# Patient Record
Sex: MALE | Race: OTHER | URBAN - METROPOLITAN AREA
[De-identification: names, ages, dates, MRNs, and addresses within clinical notes are randomized per-mention and may not be internally consistent; named-entity substitution may affect disease eponyms.]

---

## 2020-06-01 ENCOUNTER — OFFICE VISIT (OUTPATIENT)
Dept: URBAN - METROPOLITAN AREA CLINIC 68 | Facility: CLINIC | Age: 65
End: 2020-06-01

## 2020-06-04 LAB
ALBUMIN/GLOBULIN RATIO: (no result)
ALBUMIN: (no result)
ALKALINE PHOSPHATASE: (no result)
ALT: (no result)
AST: (no result)
BILIRUBIN, TOTAL: (no result)
BUN/CREATININE RATIO: (no result)
CALCIUM: (no result)
CARBON DIOXIDE: (no result)
CHLORIDE: (no result)
CREATININE: (no result)
EGFR AFRICAN AMERICAN: (no result)
EGFR NON-AFR. AMERICAN: (no result)
GLOBULIN: (no result)
GLUCOSE: (no result)
POTASSIUM: (no result)
PROTEIN, TOTAL: (no result)
SODIUM: (no result)
UREA NITROGEN (BUN): (no result)

## 2020-06-05 ENCOUNTER — LAB OUTSIDE AN ENCOUNTER (OUTPATIENT)
Dept: URBAN - METROPOLITAN AREA CLINIC 68 | Facility: CLINIC | Age: 65
End: 2020-06-05

## 2020-06-08 ENCOUNTER — TELEPHONE ENCOUNTER (OUTPATIENT)
Dept: URBAN - METROPOLITAN AREA CLINIC 68 | Facility: CLINIC | Age: 65
End: 2020-06-08

## 2020-06-10 ENCOUNTER — OFFICE VISIT (OUTPATIENT)
Dept: URBAN - METROPOLITAN AREA CLINIC 68 | Facility: CLINIC | Age: 65
End: 2020-06-10

## 2020-06-22 ENCOUNTER — TELEPHONE ENCOUNTER (OUTPATIENT)
Dept: URBAN - METROPOLITAN AREA CLINIC 68 | Facility: CLINIC | Age: 65
End: 2020-06-22

## 2020-06-22 ENCOUNTER — OFFICE VISIT (OUTPATIENT)
Dept: URBAN - METROPOLITAN AREA MEDICAL CENTER 21 | Facility: MEDICAL CENTER | Age: 65
End: 2020-06-22

## 2020-06-25 ENCOUNTER — TELEPHONE ENCOUNTER (OUTPATIENT)
Dept: URBAN - METROPOLITAN AREA CLINIC 68 | Facility: CLINIC | Age: 65
End: 2020-06-25

## 2020-07-08 ENCOUNTER — OFFICE VISIT (OUTPATIENT)
Dept: URBAN - METROPOLITAN AREA CLINIC 68 | Facility: CLINIC | Age: 65
End: 2020-07-08

## 2020-07-13 ENCOUNTER — OFFICE VISIT (OUTPATIENT)
Dept: URBAN - METROPOLITAN AREA CLINIC 68 | Facility: CLINIC | Age: 65
End: 2020-07-13

## 2020-08-07 ENCOUNTER — OFFICE VISIT (OUTPATIENT)
Dept: URBAN - METROPOLITAN AREA CLINIC 68 | Facility: CLINIC | Age: 65
End: 2020-08-07

## 2020-08-12 ENCOUNTER — TELEPHONE ENCOUNTER (OUTPATIENT)
Dept: URBAN - METROPOLITAN AREA CLINIC 68 | Facility: CLINIC | Age: 65
End: 2020-08-12

## 2020-08-28 ENCOUNTER — OFFICE VISIT (OUTPATIENT)
Dept: URBAN - METROPOLITAN AREA CLINIC 68 | Facility: CLINIC | Age: 65
End: 2020-08-28

## 2020-09-08 ENCOUNTER — TELEPHONE ENCOUNTER (OUTPATIENT)
Dept: URBAN - METROPOLITAN AREA CLINIC 68 | Facility: CLINIC | Age: 65
End: 2020-09-08

## 2020-09-15 ENCOUNTER — TELEPHONE ENCOUNTER (OUTPATIENT)
Dept: URBAN - METROPOLITAN AREA CLINIC 68 | Facility: CLINIC | Age: 65
End: 2020-09-15

## 2020-09-15 ENCOUNTER — OFFICE VISIT (OUTPATIENT)
Dept: URBAN - METROPOLITAN AREA CLINIC 68 | Facility: CLINIC | Age: 65
End: 2020-09-15

## 2020-09-23 ENCOUNTER — TELEPHONE ENCOUNTER (OUTPATIENT)
Dept: URBAN - METROPOLITAN AREA CLINIC 68 | Facility: CLINIC | Age: 65
End: 2020-09-23

## 2020-09-30 ENCOUNTER — OFFICE VISIT (OUTPATIENT)
Dept: URBAN - METROPOLITAN AREA CLINIC 68 | Facility: CLINIC | Age: 65
End: 2020-09-30

## 2020-10-15 ENCOUNTER — TELEPHONE ENCOUNTER (OUTPATIENT)
Dept: URBAN - METROPOLITAN AREA CLINIC 68 | Facility: CLINIC | Age: 65
End: 2020-10-15

## 2020-10-16 ENCOUNTER — OFFICE VISIT (OUTPATIENT)
Dept: URBAN - METROPOLITAN AREA SURGERY CENTER 12 | Facility: SURGERY CENTER | Age: 65
End: 2020-10-16

## 2020-10-26 ENCOUNTER — OFFICE VISIT (OUTPATIENT)
Dept: URBAN - METROPOLITAN AREA MEDICAL CENTER 21 | Facility: MEDICAL CENTER | Age: 65
End: 2020-10-26

## 2021-03-09 ENCOUNTER — OFFICE VISIT (OUTPATIENT)
Age: 66
End: 2021-03-09

## 2021-04-15 ENCOUNTER — OFFICE VISIT (OUTPATIENT)
Age: 66
End: 2021-04-15

## 2021-06-16 ENCOUNTER — OFFICE VISIT (OUTPATIENT)
Dept: URBAN - METROPOLITAN AREA CLINIC 68 | Facility: CLINIC | Age: 66
End: 2021-06-16

## 2022-01-05 ENCOUNTER — OFFICE VISIT (OUTPATIENT)
Age: 67
End: 2022-01-05

## 2022-05-03 ENCOUNTER — OFFICE VISIT (OUTPATIENT)
Dept: URBAN - METROPOLITAN AREA CLINIC 68 | Facility: CLINIC | Age: 67
End: 2022-05-03

## 2022-05-06 ENCOUNTER — OFFICE VISIT (OUTPATIENT)
Dept: URBAN - METROPOLITAN AREA CLINIC 68 | Facility: CLINIC | Age: 67
End: 2022-05-06

## 2022-05-09 ENCOUNTER — TELEPHONE ENCOUNTER (OUTPATIENT)
Dept: URBAN - METROPOLITAN AREA CLINIC 68 | Facility: CLINIC | Age: 67
End: 2022-05-09

## 2022-05-23 ENCOUNTER — OFFICE VISIT (OUTPATIENT)
Dept: URBAN - METROPOLITAN AREA MEDICAL CENTER 21 | Facility: MEDICAL CENTER | Age: 67
End: 2022-05-23

## 2022-06-04 ENCOUNTER — TELEPHONE ENCOUNTER (OUTPATIENT)
Dept: URBAN - METROPOLITAN AREA CLINIC 68 | Facility: CLINIC | Age: 67
End: 2022-06-04

## 2022-06-04 RX ORDER — AMOXICILLIN 500 MG/1
TABLET, FILM COATED ORAL EVERY 12 HOURS
Qty: 56 | Refills: 0 | OUTPATIENT
Start: 2019-04-11 | End: 2019-04-25

## 2022-06-04 RX ORDER — SODIUM SULFATE, POTASSIUM SULFATE, MAGNESIUM SULFATE 17.5; 3.13; 1.6 G/ML; G/ML; G/ML
SOLUTION, CONCENTRATE ORAL AS DIRECTED
Qty: 1 | Refills: 0 | OUTPATIENT
Start: 2019-03-13 | End: 2019-03-14

## 2022-06-04 RX ORDER — CLARITHROMYCIN 500 MG/1
TABLET, FILM COATED ORAL EVERY 12 HOURS
Qty: 28 | Refills: 0 | OUTPATIENT
Start: 2019-04-11 | End: 2019-04-25

## 2022-06-04 RX ORDER — AMOXICILLIN 500 MG/1
AMOXICILLIN( 500MG ORAL  TWICE A DAY ) INACTIVE -HX ENTRY CAPSULE ORAL TWICE A DAY
OUTPATIENT
Start: 2020-04-29

## 2022-06-04 RX ORDER — DICYCLOMINE HYDROCHLORIDE 10 MG/1
CAPSULE ORAL EVERY 12 HOURS
Qty: 60 | Refills: 0 | OUTPATIENT
Start: 2020-07-13 | End: 2020-08-12

## 2022-06-05 ENCOUNTER — TELEPHONE ENCOUNTER (OUTPATIENT)
Dept: URBAN - METROPOLITAN AREA CLINIC 68 | Facility: CLINIC | Age: 67
End: 2022-06-05

## 2022-06-05 RX ORDER — PANTOPRAZOLE SODIUM 40 MG/1
TABLET, DELAYED RELEASE ORAL DAILY
Qty: 90 | Refills: 90 | Status: ACTIVE | COMMUNITY
Start: 2020-07-13

## 2022-06-05 RX ORDER — TAMSULOSIN HYDROCHLORIDE 0.4 MG/1
TAMSULOSIN HCL( 0.4MG ORAL  DAILY ) ACTIVE -HX ENTRY CAPSULE ORAL DAILY
Status: ACTIVE | COMMUNITY
Start: 2020-09-30

## 2022-06-05 RX ORDER — LEVOTHYROXINE SODIUM 0.2 MG/1
LEVOTHYROXINE SODIUM( 200MCG ORAL  DAILY ) ACTIVE -HX ENTRY TABLET ORAL DAILY
Status: ACTIVE | COMMUNITY
Start: 2020-09-30

## 2022-06-05 RX ORDER — ASPIRIN 325 MG/1
ASPIRIN( 325MG ORAL  DAILY ) ACTIVE -HX ENTRY TABLET ORAL DAILY
Status: ACTIVE | COMMUNITY
Start: 2020-09-30

## 2022-06-05 RX ORDER — SERTRALINE 100 MG/1
SERTRALINE HCL( 100MG ORAL  DAILY ) ACTIVE -HX ENTRY TABLET, FILM COATED ORAL DAILY
Status: ACTIVE | COMMUNITY
Start: 2020-09-30

## 2022-06-05 RX ORDER — ATORVASTATIN CALCIUM 80 MG/1
ATORVASTATIN CALCIUM( 80MG ORAL  DAILY ) ACTIVE -HX ENTRY TABLET, FILM COATED ORAL DAILY
Status: ACTIVE | COMMUNITY
Start: 2020-09-30

## 2022-06-05 RX ORDER — WHEAT DEXTRIN 3 G/4 G
POWDER (GRAM) ORAL
Qty: 0 | Refills: 0 | Status: ACTIVE | COMMUNITY
Start: 2020-07-13

## 2022-06-05 RX ORDER — CLOPIDOGREL 75 MG/1
CLOPIDOGREL BISULFATE( 75MG ORAL  DAILY ) ACTIVE -HX ENTRY TABLET ORAL DAILY
Status: ACTIVE | COMMUNITY
Start: 2020-09-30

## 2022-06-05 RX ORDER — FUROSEMIDE 40 MG/1
FUROSEMIDE( 40MG ORAL  DAILY ) ACTIVE -HX ENTRY TABLET ORAL DAILY
Status: ACTIVE | COMMUNITY
Start: 2020-09-30

## 2022-06-05 RX ORDER — FLUOXETINE 20 MG/1
FLUOXETINE HCL( 20MG ORAL  DAILY ) ACTIVE -HX ENTRY CAPSULE ORAL DAILY
Status: ACTIVE | COMMUNITY
Start: 2020-09-30

## 2022-06-05 RX ORDER — CIDER VINEGAR 300 MG
FISH OIL( 1000MG ORAL  TWICE A DAY ) ACTIVE -HX ENTRY TABLET ORAL TWICE A DAY
Status: ACTIVE | COMMUNITY
Start: 2020-09-30

## 2022-06-05 RX ORDER — NITROGLYCERIN 0.4 MG/1
NITROGLYCERIN( 0.4MG SUBLINGUAL  AS NEEDED ) ACTIVE -HX ENTRY TABLET SUBLINGUAL AS NEEDED
Status: ACTIVE | COMMUNITY
Start: 2020-09-30

## 2022-06-05 RX ORDER — LISINOPRIL 10 MG/1
LISINOPRIL( 10MG ORAL  DAILY ) ACTIVE -HX ENTRY TABLET ORAL DAILY
Status: ACTIVE | COMMUNITY
Start: 2020-09-30

## 2022-06-06 ENCOUNTER — OFFICE VISIT (OUTPATIENT)
Dept: URBAN - METROPOLITAN AREA MEDICAL CENTER 21 | Facility: MEDICAL CENTER | Age: 67
End: 2022-06-06

## 2022-06-08 ENCOUNTER — TELEPHONE ENCOUNTER (OUTPATIENT)
Dept: URBAN - METROPOLITAN AREA CLINIC 68 | Facility: CLINIC | Age: 67
End: 2022-06-08

## 2022-06-17 ENCOUNTER — OFFICE VISIT (OUTPATIENT)
Dept: URBAN - METROPOLITAN AREA CLINIC 68 | Facility: CLINIC | Age: 67
End: 2022-06-17

## 2022-06-23 ENCOUNTER — LAB OUTSIDE AN ENCOUNTER (OUTPATIENT)
Dept: URBAN - METROPOLITAN AREA CLINIC 68 | Facility: CLINIC | Age: 67
End: 2022-06-23

## 2022-06-23 LAB
ABSOLUTE BASOPHILS: (no result)
ABSOLUTE EOSINOPHILS: (no result)
ABSOLUTE LYMPHOCYTES: (no result)
ABSOLUTE MONOCYTES: (no result)
ABSOLUTE NEUTROPHILS: (no result)
ALBUMIN/GLOBULIN RATIO: (no result)
ALBUMIN: (no result)
ALKALINE PHOSPHATASE: (no result)
ALPHA 2 MACROGLOBULIN: (no result)
ALT: (no result)
ALT: (no result)
APOLIPOPROTEIN A1: (no result)
AST: (no result)
BASOPHILS: (no result)
BILIRUBIN, TOTAL: (no result)
BUN/CREATININE RATIO: (no result)
CALCIUM: (no result)
CARBON DIOXIDE: (no result)
CHLORIDE: (no result)
CREATININE: (no result)
EGFR AFRICAN AMERICAN: (no result)
EGFR NON-AFR. AMERICAN: (no result)
EOSINOPHILS: (no result)
FIBROSIS INTERPRETATION: (no result)
FIBROSIS SCORE: 0.52
FIBROSIS STAGE: (no result)
FOOTNOTE: (no result)
GGT: (no result)
GLOBULIN: (no result)
GLUCOSE: (no result)
HAPTOGLOBIN: (no result)
HEMATOCRIT: (no result)
HEMOGLOBIN: (no result)
INR: 1
LYMPHOCYTES: (no result)
MCH: (no result)
MCHC: (no result)
MCV: (no result)
MONOCYTES: (no result)
MPV: (no result)
NECROINFLAMMAT ACT GRADE: (no result)
NECROINFLAMMAT ACT SCORE: 0.35
NECROINFLAMMAT INTERP: (no result)
NEUTROPHILS: (no result)
PARTIAL THROMBOPLASTIN TIME, ACTIVATED: (no result)
PLATELET COUNT: (no result)
POTASSIUM: (no result)
PROTEIN, TOTAL: (no result)
PT: (no result)
RDW: (no result)
RED BLOOD CELL COUNT: (no result)
REFERENCE ID: (no result)
SODIUM: (no result)
TOTAL BILIRUBIN: (no result)
UREA NITROGEN (BUN): (no result)
WHITE BLOOD CELL COUNT: (no result)

## 2022-06-25 ENCOUNTER — TELEPHONE ENCOUNTER (OUTPATIENT)
Age: 67
End: 2022-06-25

## 2022-06-25 RX ORDER — POLYETHYLENE GLYCOL 3350, SODIUM SULFATE ANHYDROUS, SODIUM BICARBONATE, SODIUM CHLORIDE, POTASSIUM CHLORIDE 236; 22.74; 6.74; 5.86; 2.97 G/4L; G/4L; G/4L; G/4L; G/4L
POWDER, FOR SOLUTION ORAL DAILY
Qty: 1 | Refills: 0 | OUTPATIENT
Start: 2022-05-03 | End: 2022-05-04

## 2022-06-25 RX ORDER — AMOXICILLIN 500 MG/1
TABLET, FILM COATED ORAL EVERY 12 HOURS
Qty: 56 | Refills: 0 | OUTPATIENT
Start: 2019-04-11 | End: 2019-04-25

## 2022-06-25 RX ORDER — METHYLPREDNISOLONE 4 MG/1
METHYLPREDNISOLONE (PAK)( 4MG ORAL  AS DIRECTED ) INACTIVE -HX ENTRY TABLET ORAL AS DIRECTED
OUTPATIENT
Start: 2020-04-29

## 2022-06-25 RX ORDER — AMOXICILLIN 500 MG/1
AMOXICILLIN( 500MG ORAL  TWICE A DAY ) INACTIVE -HX ENTRY CAPSULE ORAL TWICE A DAY
OUTPATIENT
Start: 2020-04-29

## 2022-06-25 RX ORDER — DICYCLOMINE HYDROCHLORIDE 10 MG/1
CAPSULE ORAL EVERY 12 HOURS
Qty: 60 | Refills: 0 | OUTPATIENT
Start: 2020-07-13 | End: 2020-08-12

## 2022-06-25 RX ORDER — CLOPIDOGREL BISULFATE 75 MG/1
CLOPIDOGREL BISULFATE( 75MG ORAL  DAILY ) INACTIVE -HX ENTRY TABLET, FILM COATED ORAL DAILY
OUTPATIENT
Start: 2022-05-03

## 2022-06-25 RX ORDER — SODIUM SULFATE, POTASSIUM SULFATE, MAGNESIUM SULFATE 17.5; 3.13; 1.6 G/ML; G/ML; G/ML
SOLUTION, CONCENTRATE ORAL AS DIRECTED
Qty: 1 | Refills: 0 | OUTPATIENT
Start: 2019-03-13 | End: 2019-03-14

## 2022-06-25 RX ORDER — CLARITHROMYCIN 500 MG/1
TABLET ORAL EVERY 12 HOURS
Qty: 28 | Refills: 0 | OUTPATIENT
Start: 2019-04-11 | End: 2019-04-25

## 2022-06-26 ENCOUNTER — TELEPHONE ENCOUNTER (OUTPATIENT)
Age: 67
End: 2022-06-26

## 2022-06-26 RX ORDER — WHEAT DEXTRIN 3 G/4 G
POWDER (GRAM) ORAL
Qty: 0 | Refills: 0 | Status: ACTIVE | COMMUNITY
Start: 2020-07-13

## 2022-06-26 RX ORDER — SERTRALINE 100 MG/1
SERTRALINE HCL( 100MG ORAL  DAILY ) ACTIVE -HX ENTRY TABLET, FILM COATED ORAL DAILY
Status: ACTIVE | COMMUNITY
Start: 2022-06-17

## 2022-06-26 RX ORDER — FAMOTIDINE 20 MG/1
TABLET ORAL DAILY
Qty: 90 | Refills: 0 | Status: ACTIVE | COMMUNITY
Start: 2022-05-03

## 2022-06-26 RX ORDER — ASPIRIN 325 MG/1
ASPIRIN( 325MG ORAL  DAILY ) ACTIVE -HX ENTRY TABLET ORAL DAILY
Status: ACTIVE | COMMUNITY
Start: 2022-06-17

## 2022-06-26 RX ORDER — LISINOPRIL 10 MG/1
LISINOPRIL( 10MG ORAL  DAILY ) ACTIVE -HX ENTRY TABLET ORAL DAILY
Status: ACTIVE | COMMUNITY
Start: 2022-06-17

## 2022-06-26 RX ORDER — ATORVASTATIN CALCIUM 80 MG/1
ATORVASTATIN CALCIUM( 80MG ORAL  DAILY ) ACTIVE -HX ENTRY TABLET, FILM COATED ORAL DAILY
Status: ACTIVE | COMMUNITY
Start: 2022-06-17

## 2022-06-26 RX ORDER — NITROGLYCERIN 0.4 MG/1
NITROGLYCERIN( 0.4MG SUBLINGUAL  AS NEEDED ) ACTIVE -HX ENTRY TABLET SUBLINGUAL AS NEEDED
Status: ACTIVE | COMMUNITY
Start: 2022-06-17

## 2022-06-26 RX ORDER — LEVOTHYROXINE SODIUM 200 UG/1
LEVOTHYROXINE SODIUM( 200MCG ORAL  DAILY ) ACTIVE -HX ENTRY TABLET ORAL DAILY
Status: ACTIVE | COMMUNITY
Start: 2022-06-17

## 2022-06-26 RX ORDER — CIDER VINEGAR 300 MG
FISH OIL( 1000MG ORAL  TWICE A DAY ) ACTIVE -HX ENTRY TABLET ORAL TWICE A DAY
Status: ACTIVE | COMMUNITY
Start: 2022-06-17

## 2022-06-26 RX ORDER — TAMSULOSIN HYDROCHLORIDE 0.4 MG/1
TAMSULOSIN HCL( 0.4MG ORAL  DAILY ) ACTIVE -HX ENTRY CAPSULE ORAL DAILY
Status: ACTIVE | COMMUNITY
Start: 2022-06-17

## 2022-06-26 RX ORDER — FLUOXETINE HYDROCHLORIDE 20 MG/1
FLUOXETINE HCL( 20MG ORAL  DAILY ) ACTIVE -HX ENTRY CAPSULE ORAL DAILY
Status: ACTIVE | COMMUNITY
Start: 2022-06-17

## 2022-06-26 RX ORDER — FUROSEMIDE 40 MG/1
FUROSEMIDE( 40MG ORAL  DAILY ) ACTIVE -HX ENTRY TABLET ORAL DAILY
Status: ACTIVE | COMMUNITY
Start: 2022-06-17

## 2022-06-26 RX ORDER — METOPROLOL SUCCINATE 50 MG/1
METOPROLOL SUCCINATE( 50MG ORAL 1 1/2 TABS DAILY ) ACTIVE -HX ENTRY TABLET, EXTENDED RELEASE ORAL DAILY
Status: ACTIVE | COMMUNITY
Start: 2022-06-17

## 2022-06-26 RX ORDER — PANTOPRAZOLE 40 MG/1
TABLET, DELAYED RELEASE ORAL EVERY 12 HOURS
Qty: 180 | Refills: 180 | Status: ACTIVE | COMMUNITY
Start: 2022-06-17

## 2022-06-27 ENCOUNTER — OFFICE VISIT (OUTPATIENT)
Age: 67
End: 2022-06-27

## 2022-06-27 ENCOUNTER — TELEPHONE ENCOUNTER (OUTPATIENT)
Dept: URBAN - METROPOLITAN AREA CLINIC 68 | Facility: CLINIC | Age: 67
End: 2022-06-27

## 2022-06-28 ENCOUNTER — LAB OUTSIDE AN ENCOUNTER (OUTPATIENT)
Age: 67
End: 2022-06-28

## 2022-06-30 LAB
ABSOLUTE BASOPHILS: 29
ABSOLUTE EOSINOPHILS: 133
ABSOLUTE LYMPHOCYTES: 795
ABSOLUTE MONOCYTES: 394
ABSOLUTE NEUTROPHILS: 4449
ALBUMIN/GLOBULIN RATIO: 1.5
ALBUMIN: 4.9
ALKALINE PHOSPHATASE: 122
ALPHA 2 MACROGLOBULIN: 216
ALT: 48
ALT: 48
APOLIPOPROTEIN A1: 135
AST: 46
BASOPHILS: 0.5
BILIRUBIN, TOTAL: 0.5
BUN/CREATININE RATIO: (no result)
CALCIUM: 10.3
CARBON DIOXIDE: 25
CHLORIDE: 105
CREATININE: 1.2
EGFR AFRICAN AMERICAN: 73
EGFR NON-AFR. AMERICAN: 63
EOSINOPHILS: 2.3
FIBROSIS INTERPRETATION: (no result)
FIBROSIS SCORE: 0.52
FIBROSIS STAGE: (no result)
FOOTNOTE: (no result)
GGT: 202
GLOBULIN: 3.2
GLUCOSE: 118
HAPTOGLOBIN: 190
HEMATOCRIT: 42.5
HEMOGLOBIN: 13.4
INR: 1
LYMPHOCYTES: 13.7
MCH: 27.5
MCHC: 31.5
MCV: 87.1
MONOCYTES: 6.8
MPV: 12.5
NECROINFLAMMAT ACT GRADE: (no result)
NECROINFLAMMAT ACT SCORE: 0.35
NECROINFLAMMAT INTERP: (no result)
NEUTROPHILS: 76.7
PARTIAL THROMBOPLASTIN: 24
PLATELET COUNT: 167
POTASSIUM: 4.5
PROTEIN, TOTAL: 8.1
PT: 10.4
RDW: 12.7
RED BLOOD CELL COUNT: 4.88
REFERENCE ID: (no result)
SODIUM: 145
TOTAL BILIRUBIN: 0.5
UREA NITROGEN (BUN): 18
WHITE BLOOD CELL COUNT: 5.8

## 2022-07-23 ENCOUNTER — TELEPHONE ENCOUNTER (OUTPATIENT)
Dept: URBAN - METROPOLITAN AREA CLINIC 68 | Facility: CLINIC | Age: 67
End: 2022-07-23

## 2022-08-02 ENCOUNTER — OFFICE VISIT (OUTPATIENT)
Dept: URBAN - METROPOLITAN AREA CLINIC 68 | Facility: CLINIC | Age: 67
End: 2022-08-02

## 2022-08-02 ENCOUNTER — LAB OUTSIDE AN ENCOUNTER (OUTPATIENT)
Dept: URBAN - METROPOLITAN AREA CLINIC 68 | Facility: CLINIC | Age: 67
End: 2022-08-02

## 2022-08-02 RX ORDER — CIDER VINEGAR 300 MG
FISH OIL( 1000MG ORAL  TWICE A DAY ) ACTIVE -HX ENTRY TABLET ORAL TWICE A DAY
Status: ACTIVE | COMMUNITY
Start: 2020-09-30

## 2022-08-02 RX ORDER — NITROGLYCERIN 0.4 MG/1
NITROGLYCERIN( 0.4MG SUBLINGUAL  AS NEEDED ) ACTIVE -HX ENTRY TABLET SUBLINGUAL AS NEEDED
Status: ACTIVE | COMMUNITY
Start: 2020-09-30

## 2022-08-02 RX ORDER — PANTOPRAZOLE SODIUM 40 MG/1
TABLET, DELAYED RELEASE ORAL DAILY
Qty: 90 | Refills: 90 | Status: ACTIVE | COMMUNITY
Start: 2020-07-13

## 2022-08-02 RX ORDER — TAMSULOSIN HYDROCHLORIDE 0.4 MG/1
TAMSULOSIN HCL( 0.4MG ORAL  DAILY ) ACTIVE -HX ENTRY CAPSULE ORAL DAILY
Status: ACTIVE | COMMUNITY
Start: 2020-09-30

## 2022-08-02 RX ORDER — LISINOPRIL 10 MG/1
LISINOPRIL( 10MG ORAL  DAILY ) ACTIVE -HX ENTRY TABLET ORAL DAILY
Status: ACTIVE | COMMUNITY
Start: 2020-09-30

## 2022-08-02 RX ORDER — ASPIRIN 325 MG/1
ASPIRIN( 325MG ORAL  DAILY ) ACTIVE -HX ENTRY TABLET ORAL DAILY
Status: ACTIVE | COMMUNITY
Start: 2020-09-30

## 2022-08-02 RX ORDER — ATORVASTATIN CALCIUM 80 MG/1
ATORVASTATIN CALCIUM( 80MG ORAL  DAILY ) ACTIVE -HX ENTRY TABLET, FILM COATED ORAL DAILY
Status: ACTIVE | COMMUNITY
Start: 2020-09-30

## 2022-08-02 RX ORDER — FUROSEMIDE 40 MG/1
FUROSEMIDE( 40MG ORAL  DAILY ) ACTIVE -HX ENTRY TABLET ORAL DAILY
Status: ACTIVE | COMMUNITY
Start: 2020-09-30

## 2022-08-02 RX ORDER — LEVOTHYROXINE SODIUM 0.2 MG/1
LEVOTHYROXINE SODIUM( 200MCG ORAL  DAILY ) ACTIVE -HX ENTRY TABLET ORAL DAILY
Status: ACTIVE | COMMUNITY
Start: 2020-09-30

## 2022-08-02 RX ORDER — CLOPIDOGREL 75 MG/1
CLOPIDOGREL BISULFATE( 75MG ORAL  DAILY ) ACTIVE -HX ENTRY TABLET ORAL DAILY
Status: ACTIVE | COMMUNITY
Start: 2020-09-30

## 2022-08-02 RX ORDER — FLUOXETINE 20 MG/1
FLUOXETINE HCL( 20MG ORAL  DAILY ) ACTIVE -HX ENTRY CAPSULE ORAL DAILY
Status: ACTIVE | COMMUNITY
Start: 2020-09-30

## 2022-08-02 RX ORDER — SERTRALINE 100 MG/1
SERTRALINE HCL( 100MG ORAL  DAILY ) ACTIVE -HX ENTRY TABLET, FILM COATED ORAL DAILY
Status: ACTIVE | COMMUNITY
Start: 2020-09-30

## 2022-08-02 RX ORDER — WHEAT DEXTRIN 3 G/4 G
POWDER (GRAM) ORAL
Qty: 0 | Refills: 0 | Status: ACTIVE | COMMUNITY
Start: 2020-07-13

## 2022-08-02 NOTE — HPI-MIGRATED HPI
General : Pateint comes in for follow up of heartburn.  On the last appointment he was place on Pantoprazole bid and H2 blockers PRN. For evaluation he underwent EGD resutls are detailed below.  He refers that his symptoms are controlled  on PPi bid. He has not had to use H2 blockers. He has not other complaints

## 2022-08-30 ENCOUNTER — TELEPHONE ENCOUNTER (OUTPATIENT)
Dept: URBAN - METROPOLITAN AREA CLINIC 68 | Facility: CLINIC | Age: 67
End: 2022-08-30

## 2022-08-31 ENCOUNTER — OFFICE VISIT (OUTPATIENT)
Dept: URBAN - METROPOLITAN AREA CLINIC 68 | Facility: CLINIC | Age: 67
End: 2022-08-31

## 2022-08-31 ENCOUNTER — LAB OUTSIDE AN ENCOUNTER (OUTPATIENT)
Dept: URBAN - METROPOLITAN AREA CLINIC 68 | Facility: CLINIC | Age: 67
End: 2022-08-31

## 2022-08-31 RX ORDER — ATORVASTATIN CALCIUM 80 MG/1
ATORVASTATIN CALCIUM( 80MG ORAL  DAILY ) ACTIVE -HX ENTRY TABLET, FILM COATED ORAL DAILY
Status: ACTIVE | COMMUNITY
Start: 2020-09-30

## 2022-08-31 RX ORDER — FUROSEMIDE 40 MG/1
FUROSEMIDE( 40MG ORAL  DAILY ) ACTIVE -HX ENTRY TABLET ORAL DAILY
Status: ACTIVE | COMMUNITY
Start: 2020-09-30

## 2022-08-31 RX ORDER — LISINOPRIL 10 MG/1
LISINOPRIL( 10MG ORAL  DAILY ) ACTIVE -HX ENTRY TABLET ORAL DAILY
Status: ACTIVE | COMMUNITY
Start: 2020-09-30

## 2022-08-31 RX ORDER — LEVOTHYROXINE SODIUM 0.2 MG/1
LEVOTHYROXINE SODIUM( 200MCG ORAL  DAILY ) ACTIVE -HX ENTRY TABLET ORAL DAILY
Status: ACTIVE | COMMUNITY
Start: 2020-09-30

## 2022-08-31 RX ORDER — NITROGLYCERIN 0.4 MG/1
NITROGLYCERIN( 0.4MG SUBLINGUAL  AS NEEDED ) ACTIVE -HX ENTRY TABLET SUBLINGUAL AS NEEDED
Status: ACTIVE | COMMUNITY
Start: 2020-09-30

## 2022-08-31 RX ORDER — FLUOXETINE 20 MG/1
FLUOXETINE HCL( 20MG ORAL  DAILY ) ACTIVE -HX ENTRY CAPSULE ORAL DAILY
Status: ACTIVE | COMMUNITY
Start: 2020-09-30

## 2022-08-31 RX ORDER — TAMSULOSIN HYDROCHLORIDE 0.4 MG/1
TAMSULOSIN HCL( 0.4MG ORAL  DAILY ) ACTIVE -HX ENTRY CAPSULE ORAL DAILY
Status: ACTIVE | COMMUNITY
Start: 2020-09-30

## 2022-08-31 RX ORDER — WHEAT DEXTRIN 3 G/4 G
POWDER (GRAM) ORAL
Qty: 0 | Refills: 0 | Status: ACTIVE | COMMUNITY
Start: 2020-07-13

## 2022-08-31 RX ORDER — PANTOPRAZOLE SODIUM 40 MG/1
TABLET, DELAYED RELEASE ORAL DAILY
Qty: 90 | Refills: 90 | Status: ACTIVE | COMMUNITY
Start: 2020-07-13

## 2022-08-31 RX ORDER — ASPIRIN 325 MG/1
ASPIRIN( 325MG ORAL  DAILY ) ACTIVE -HX ENTRY TABLET ORAL DAILY
Status: ACTIVE | COMMUNITY
Start: 2020-09-30

## 2022-08-31 RX ORDER — CIDER VINEGAR 300 MG
FISH OIL( 1000MG ORAL  TWICE A DAY ) ACTIVE -HX ENTRY TABLET ORAL TWICE A DAY
Status: ACTIVE | COMMUNITY
Start: 2020-09-30

## 2022-08-31 RX ORDER — SERTRALINE 100 MG/1
SERTRALINE HCL( 100MG ORAL  DAILY ) ACTIVE -HX ENTRY TABLET, FILM COATED ORAL DAILY
Status: ACTIVE | COMMUNITY
Start: 2020-09-30

## 2022-08-31 RX ORDER — CLOPIDOGREL 75 MG/1
CLOPIDOGREL BISULFATE( 75MG ORAL  DAILY ) ACTIVE -HX ENTRY TABLET ORAL DAILY
Status: ACTIVE | COMMUNITY
Start: 2020-09-30

## 2022-08-31 NOTE — HPI-MIGRATED HPI
General : Patient comes in for follow up of liver steatosis. He is being followed up due to fatty liver. He underwent liver elastography remarkable for advance fibrosis. 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 He comes in today for follow up. He denies melena hematochezia hematemesis coffee ground emesis.

## 2023-05-23 ENCOUNTER — OFFICE VISIT (OUTPATIENT)
Age: 68
End: 2023-05-23

## 2023-06-06 ENCOUNTER — CLAIMS CREATED FROM THE CLAIM WINDOW (OUTPATIENT)
Dept: URBAN - METROPOLITAN AREA MEDICAL CENTER 20 | Facility: MEDICAL CENTER | Age: 68
End: 2023-06-06
Payer: OTHER GOVERNMENT

## 2023-06-06 DIAGNOSIS — N30.01 ACUTE CYSTITIS WITH HEMATURIA: ICD-10-CM

## 2023-06-06 DIAGNOSIS — I50.21 ACUTE SYSTOLIC CONGESTIVE HEART FAILURE: ICD-10-CM

## 2023-06-06 DIAGNOSIS — I48.91 AFIB: ICD-10-CM

## 2023-06-06 DIAGNOSIS — D64.9 ANEMIA: ICD-10-CM

## 2023-06-06 PROCEDURE — 99223 1ST HOSP IP/OBS HIGH 75: CPT | Performed by: SPECIALIST

## 2023-07-10 ENCOUNTER — LAB OUTSIDE AN ENCOUNTER (OUTPATIENT)
Dept: URBAN - METROPOLITAN AREA CLINIC 68 | Facility: CLINIC | Age: 68
End: 2023-07-10

## 2023-07-10 ENCOUNTER — OFFICE VISIT (OUTPATIENT)
Dept: URBAN - METROPOLITAN AREA CLINIC 68 | Facility: CLINIC | Age: 68
End: 2023-07-10
Payer: OTHER GOVERNMENT

## 2023-07-10 VITALS
WEIGHT: 251 LBS | SYSTOLIC BLOOD PRESSURE: 130 MMHG | DIASTOLIC BLOOD PRESSURE: 80 MMHG | BODY MASS INDEX: 33.27 KG/M2 | HEIGHT: 73 IN

## 2023-07-10 DIAGNOSIS — K75.81 NONALCOHOLIC STEATOHEPATITIS (NASH): ICD-10-CM

## 2023-07-10 DIAGNOSIS — Z95.810 AICD (AUTOMATIC CARDIOVERTER/DEFIBRILLATOR) PRESENT: ICD-10-CM

## 2023-07-10 DIAGNOSIS — R19.5 OCCULT BLOOD IN STOOLS: ICD-10-CM

## 2023-07-10 DIAGNOSIS — K76.0 FATTY LIVER: ICD-10-CM

## 2023-07-10 DIAGNOSIS — K74.60 UNSPECIFIED CIRRHOSIS OF LIVER: ICD-10-CM

## 2023-07-10 DIAGNOSIS — D50.9 IRON DEFICIENCY ANEMIA, UNSPECIFIED IRON DEFICIENCY ANEMIA TYPE: ICD-10-CM

## 2023-07-10 PROBLEM — 443325000: Status: ACTIVE | Noted: 2023-07-10

## 2023-07-10 PROBLEM — 19943007: Status: ACTIVE | Noted: 2023-07-09

## 2023-07-10 PROBLEM — 266468003: Status: ACTIVE | Noted: 2023-07-09

## 2023-07-10 PROBLEM — 59614000: Status: ACTIVE | Noted: 2023-07-10

## 2023-07-10 PROCEDURE — 99215 OFFICE O/P EST HI 40 MIN: CPT | Performed by: INTERNAL MEDICINE

## 2023-07-10 RX ORDER — CIDER VINEGAR 300 MG
FISH OIL( 1000MG ORAL  TWICE A DAY ) ACTIVE -HX ENTRY TABLET ORAL TWICE A DAY
Status: ACTIVE | COMMUNITY
Start: 2020-09-30

## 2023-07-10 RX ORDER — ASPIRIN 325 MG/1
ASPIRIN( 325MG ORAL  DAILY ) ACTIVE -HX ENTRY TABLET ORAL DAILY
Status: ACTIVE | COMMUNITY
Start: 2020-09-30

## 2023-07-10 RX ORDER — TAMSULOSIN HYDROCHLORIDE 0.4 MG/1
TAMSULOSIN HCL( 0.4MG ORAL  DAILY ) ACTIVE -HX ENTRY CAPSULE ORAL DAILY
Status: ACTIVE | COMMUNITY
Start: 2020-09-30

## 2023-07-10 RX ORDER — PANTOPRAZOLE SODIUM 40 MG/1
TABLET, DELAYED RELEASE ORAL DAILY
Qty: 90 | Refills: 90 | Status: ACTIVE | COMMUNITY
Start: 2020-07-13

## 2023-07-10 RX ORDER — WHEAT DEXTRIN 3 G/4 G
POWDER (GRAM) ORAL
Qty: 0 | Refills: 0 | Status: ACTIVE | COMMUNITY
Start: 2020-07-13

## 2023-07-10 RX ORDER — FUROSEMIDE 40 MG/1
FUROSEMIDE( 40MG ORAL  DAILY ) ACTIVE -HX ENTRY TABLET ORAL DAILY
Status: ACTIVE | COMMUNITY
Start: 2020-09-30

## 2023-07-10 RX ORDER — CLOPIDOGREL 75 MG/1
CLOPIDOGREL BISULFATE( 75MG ORAL  DAILY ) ACTIVE -HX ENTRY TABLET ORAL DAILY
Status: ACTIVE | COMMUNITY
Start: 2020-09-30

## 2023-07-10 RX ORDER — FLUOXETINE 20 MG/1
FLUOXETINE HCL( 20MG ORAL  DAILY ) ACTIVE -HX ENTRY CAPSULE ORAL DAILY
Status: ACTIVE | COMMUNITY
Start: 2020-09-30

## 2023-07-10 RX ORDER — LEVOTHYROXINE SODIUM 0.2 MG/1
LEVOTHYROXINE SODIUM( 200MCG ORAL  DAILY ) ACTIVE -HX ENTRY TABLET ORAL DAILY
Status: ACTIVE | COMMUNITY
Start: 2020-09-30

## 2023-07-10 RX ORDER — ATORVASTATIN CALCIUM 80 MG/1
ATORVASTATIN CALCIUM( 80MG ORAL  DAILY ) ACTIVE -HX ENTRY TABLET, FILM COATED ORAL DAILY
Status: ACTIVE | COMMUNITY
Start: 2020-09-30

## 2023-07-10 RX ORDER — SERTRALINE 100 MG/1
SERTRALINE HCL( 100MG ORAL  DAILY ) ACTIVE -HX ENTRY TABLET, FILM COATED ORAL DAILY
Status: ACTIVE | COMMUNITY
Start: 2020-09-30

## 2023-07-10 RX ORDER — NITROGLYCERIN 0.4 MG/1
NITROGLYCERIN( 0.4MG SUBLINGUAL  AS NEEDED ) ACTIVE -HX ENTRY TABLET SUBLINGUAL AS NEEDED
Status: ACTIVE | COMMUNITY
Start: 2020-09-30

## 2023-07-10 RX ORDER — LISINOPRIL 10 MG/1
LISINOPRIL( 10MG ORAL  DAILY ) ACTIVE -HX ENTRY TABLET ORAL DAILY
Status: ACTIVE | COMMUNITY
Start: 2020-09-30

## 2023-07-10 NOTE — HPI-TODAY'S VISIT:
Case of a 67-year-old male patient that comes in today for follow-up.  Patient was recently seen in the inpatient setting due to worsening iron deficiency anemia without overt GI bleeding.  He did test positive for this for occult blood in the stools.  Nevertheless he denies melena hematochezia hematemesis coffee-ground emesis.  He received blood transfusions while in the inpatient setting.  No invasive GI procedures were repeated while hospitalized.  He comes in today for follow-up.  He continues on iron infusions by hematology.  He continues to deny any clinical signs for overt GI bleeding.  He comes in today for follow-up.

## 2023-07-25 ENCOUNTER — CLAIMS CREATED FROM THE CLAIM WINDOW (OUTPATIENT)
Dept: URBAN - METROPOLITAN AREA CLINIC 67 | Facility: CLINIC | Age: 68
End: 2023-07-25
Payer: OTHER GOVERNMENT

## 2023-07-25 DIAGNOSIS — K74.60 CIRRHOSIS OF LIVER WITHOUT ASCITES, UNSPECIFIED HEPATIC CIRRHOSIS TYPE: ICD-10-CM

## 2023-07-25 DIAGNOSIS — K76.0 FATTY (CHANGE OF) LIVER, NOT ELSEWHERE CLASSIFIED: ICD-10-CM

## 2023-07-25 PROCEDURE — 76705 ECHO EXAM OF ABDOMEN: CPT | Performed by: INTERNAL MEDICINE

## 2023-07-25 PROCEDURE — 93976 VASCULAR STUDY: CPT | Performed by: INTERNAL MEDICINE

## 2023-07-31 ENCOUNTER — OFFICE VISIT (OUTPATIENT)
Dept: URBAN - METROPOLITAN AREA MEDICAL CENTER 21 | Facility: MEDICAL CENTER | Age: 68
End: 2023-07-31
Payer: OTHER GOVERNMENT

## 2023-07-31 ENCOUNTER — TELEPHONE ENCOUNTER (OUTPATIENT)
Dept: URBAN - METROPOLITAN AREA CLINIC 68 | Facility: CLINIC | Age: 68
End: 2023-07-31

## 2023-07-31 DIAGNOSIS — K29.60 OTHER GASTRITIS WITHOUT BLEEDING: ICD-10-CM

## 2023-07-31 DIAGNOSIS — D50.9 IRON DEFICIENCY ANEMIA, UNSPECIFIED IRON DEFICIENCY ANEMIA TYPE: ICD-10-CM

## 2023-07-31 DIAGNOSIS — K31.89 OTHER DISEASES OF STOMACH AND DUODENUM: ICD-10-CM

## 2023-07-31 PROCEDURE — 44361 SMALL BOWEL ENDOSCOPY/BIOPSY: CPT | Performed by: INTERNAL MEDICINE

## 2023-08-01 ENCOUNTER — OFFICE VISIT (OUTPATIENT)
Dept: URBAN - METROPOLITAN AREA CLINIC 67 | Facility: CLINIC | Age: 68
End: 2023-08-01

## 2023-08-14 PROBLEM — 8493009: Status: ACTIVE | Noted: 2023-08-14

## 2023-08-15 ENCOUNTER — TELEPHONE ENCOUNTER (OUTPATIENT)
Dept: URBAN - METROPOLITAN AREA CLINIC 68 | Facility: CLINIC | Age: 68
End: 2023-08-15

## 2023-08-15 ENCOUNTER — OFFICE VISIT (OUTPATIENT)
Dept: URBAN - METROPOLITAN AREA CLINIC 68 | Facility: CLINIC | Age: 68
End: 2023-08-15
Payer: OTHER GOVERNMENT

## 2023-08-15 ENCOUNTER — LAB OUTSIDE AN ENCOUNTER (OUTPATIENT)
Dept: URBAN - METROPOLITAN AREA CLINIC 68 | Facility: CLINIC | Age: 68
End: 2023-08-15

## 2023-08-15 VITALS
DIASTOLIC BLOOD PRESSURE: 80 MMHG | BODY MASS INDEX: 33.27 KG/M2 | SYSTOLIC BLOOD PRESSURE: 146 MMHG | WEIGHT: 251 LBS | HEIGHT: 73 IN

## 2023-08-15 DIAGNOSIS — K76.0 FATTY (CHANGE OF) LIVER, NOT ELSEWHERE CLASSIFIED: ICD-10-CM

## 2023-08-15 DIAGNOSIS — D50.9 IRON DEFICIENCY ANEMIA, UNSPECIFIED IRON DEFICIENCY ANEMIA TYPE: ICD-10-CM

## 2023-08-15 PROCEDURE — 99214 OFFICE O/P EST MOD 30 MIN: CPT | Performed by: INTERNAL MEDICINE

## 2023-08-15 RX ORDER — FUROSEMIDE 40 MG/1
FUROSEMIDE( 40MG ORAL  DAILY ) ACTIVE -HX ENTRY TABLET ORAL DAILY
Status: ACTIVE | COMMUNITY
Start: 2020-09-30

## 2023-08-15 RX ORDER — PANTOPRAZOLE SODIUM 40 MG/1
TABLET, DELAYED RELEASE ORAL DAILY
Qty: 90 | Refills: 90 | Status: ACTIVE | COMMUNITY
Start: 2020-07-13

## 2023-08-15 RX ORDER — SERTRALINE 100 MG/1
SERTRALINE HCL( 100MG ORAL  DAILY ) ACTIVE -HX ENTRY TABLET, FILM COATED ORAL DAILY
Status: ACTIVE | COMMUNITY
Start: 2020-09-30

## 2023-08-15 RX ORDER — LISINOPRIL 10 MG/1
LISINOPRIL( 10MG ORAL  DAILY ) ACTIVE -HX ENTRY TABLET ORAL DAILY
Status: ACTIVE | COMMUNITY
Start: 2020-09-30

## 2023-08-15 RX ORDER — FLUOXETINE 20 MG/1
FLUOXETINE HCL( 20MG ORAL  DAILY ) ACTIVE -HX ENTRY CAPSULE ORAL DAILY
Status: ACTIVE | COMMUNITY
Start: 2020-09-30

## 2023-08-15 RX ORDER — TAMSULOSIN HYDROCHLORIDE 0.4 MG/1
TAMSULOSIN HCL( 0.4MG ORAL  DAILY ) ACTIVE -HX ENTRY CAPSULE ORAL DAILY
Status: ACTIVE | COMMUNITY
Start: 2020-09-30

## 2023-08-15 RX ORDER — WHEAT DEXTRIN 3 G/4 G
POWDER (GRAM) ORAL
Qty: 0 | Refills: 0 | Status: ACTIVE | COMMUNITY
Start: 2020-07-13

## 2023-08-15 RX ORDER — CLOPIDOGREL 75 MG/1
CLOPIDOGREL BISULFATE( 75MG ORAL  DAILY ) ACTIVE -HX ENTRY TABLET ORAL DAILY
Status: ACTIVE | COMMUNITY
Start: 2020-09-30

## 2023-08-15 RX ORDER — LEVOTHYROXINE SODIUM 0.2 MG/1
LEVOTHYROXINE SODIUM( 200MCG ORAL  DAILY ) ACTIVE -HX ENTRY TABLET ORAL DAILY
Status: ACTIVE | COMMUNITY
Start: 2020-09-30

## 2023-08-15 RX ORDER — ASPIRIN 325 MG/1
ASPIRIN( 325MG ORAL  DAILY ) ACTIVE -HX ENTRY TABLET ORAL DAILY
Status: ACTIVE | COMMUNITY
Start: 2020-09-30

## 2023-08-15 RX ORDER — CIDER VINEGAR 300 MG
FISH OIL( 1000MG ORAL  TWICE A DAY ) ACTIVE -HX ENTRY TABLET ORAL TWICE A DAY
Status: ACTIVE | COMMUNITY
Start: 2020-09-30

## 2023-08-15 RX ORDER — ATORVASTATIN CALCIUM 80 MG/1
ATORVASTATIN CALCIUM( 80MG ORAL  DAILY ) ACTIVE -HX ENTRY TABLET, FILM COATED ORAL DAILY
Status: ACTIVE | COMMUNITY
Start: 2020-09-30

## 2023-08-15 RX ORDER — NITROGLYCERIN 0.4 MG/1
NITROGLYCERIN( 0.4MG SUBLINGUAL  AS NEEDED ) ACTIVE -HX ENTRY TABLET SUBLINGUAL AS NEEDED
Status: ACTIVE | COMMUNITY
Start: 2020-09-30

## 2023-08-15 NOTE — HPI-TODAY'S VISIT:
Case of a 67-year-old male patient that comes in today for follow-up.  Patient was recently seen in the inpatient setting due to worsening iron deficiency anemia without overt GI bleeding.  He did test positive for this for occult blood in the stools.  Nevertheless he denies melena hematochezia hematemesis coffee-ground emesis.  He received blood transfusions while in the inpatient setting.  Recently for evaluation he has undergone EGD colonoscopy and enteroscopy   He comes in today for follow-up. He continues with the need for iron infusion. Continues wo overt GI bleeding

## 2023-08-21 ENCOUNTER — WEB ENCOUNTER (OUTPATIENT)
Dept: URBAN - METROPOLITAN AREA CLINIC 67 | Facility: CLINIC | Age: 68
End: 2023-08-21

## 2023-08-22 ENCOUNTER — OFFICE VISIT (OUTPATIENT)
Dept: URBAN - METROPOLITAN AREA CLINIC 67 | Facility: CLINIC | Age: 68
End: 2023-08-22

## 2023-08-31 PROBLEM — 197321007: Status: ACTIVE | Noted: 2023-08-01

## 2023-09-05 ENCOUNTER — OFFICE VISIT (OUTPATIENT)
Dept: URBAN - METROPOLITAN AREA CLINIC 67 | Facility: CLINIC | Age: 68
End: 2023-09-05
Payer: OTHER GOVERNMENT

## 2023-09-05 DIAGNOSIS — D50.9 IRON DEFICIENCY ANEMIA, UNSPECIFIED IRON DEFICIENCY ANEMIA TYPE: ICD-10-CM

## 2023-09-05 PROCEDURE — 91110 GI TRC IMG INTRAL ESOPH-ILE: CPT | Performed by: INTERNAL MEDICINE

## 2023-09-06 ENCOUNTER — TELEPHONE ENCOUNTER (OUTPATIENT)
Dept: URBAN - METROPOLITAN AREA CLINIC 63 | Facility: CLINIC | Age: 68
End: 2023-09-06

## 2023-10-25 ENCOUNTER — TELEPHONE ENCOUNTER (OUTPATIENT)
Dept: URBAN - METROPOLITAN AREA CLINIC 68 | Facility: CLINIC | Age: 68
End: 2023-10-25

## 2024-02-13 PROBLEM — 19943007: Status: ACTIVE | Noted: 2023-08-01

## 2024-02-16 ENCOUNTER — RES OV (OUTPATIENT)
Dept: URBAN - METROPOLITAN AREA CLINIC 68 | Facility: CLINIC | Age: 69
End: 2024-02-16

## 2024-02-16 RX ORDER — CLOPIDOGREL 75 MG/1
CLOPIDOGREL BISULFATE( 75MG ORAL  DAILY ) ACTIVE -HX ENTRY TABLET ORAL DAILY
Status: ACTIVE | COMMUNITY
Start: 2020-09-30

## 2024-02-16 RX ORDER — SERTRALINE 100 MG/1
SERTRALINE HCL( 100MG ORAL  DAILY ) ACTIVE -HX ENTRY TABLET, FILM COATED ORAL DAILY
Status: ACTIVE | COMMUNITY
Start: 2020-09-30

## 2024-02-16 RX ORDER — LEVOTHYROXINE SODIUM 0.2 MG/1
LEVOTHYROXINE SODIUM( 200MCG ORAL  DAILY ) ACTIVE -HX ENTRY TABLET ORAL DAILY
Status: ACTIVE | COMMUNITY
Start: 2020-09-30

## 2024-02-16 RX ORDER — LISINOPRIL 10 MG/1
LISINOPRIL( 10MG ORAL  DAILY ) ACTIVE -HX ENTRY TABLET ORAL DAILY
Status: ACTIVE | COMMUNITY
Start: 2020-09-30

## 2024-02-16 RX ORDER — PANTOPRAZOLE SODIUM 40 MG/1
TABLET, DELAYED RELEASE ORAL DAILY
Qty: 90 | Refills: 90 | Status: ACTIVE | COMMUNITY
Start: 2020-07-13

## 2024-02-16 RX ORDER — ATORVASTATIN CALCIUM 80 MG/1
ATORVASTATIN CALCIUM( 80MG ORAL  DAILY ) ACTIVE -HX ENTRY TABLET, FILM COATED ORAL DAILY
Status: ACTIVE | COMMUNITY
Start: 2020-09-30

## 2024-02-16 RX ORDER — FUROSEMIDE 40 MG/1
FUROSEMIDE( 40MG ORAL  DAILY ) ACTIVE -HX ENTRY TABLET ORAL DAILY
Status: ACTIVE | COMMUNITY
Start: 2020-09-30

## 2024-02-16 RX ORDER — WHEAT DEXTRIN 3 G/4 G
POWDER (GRAM) ORAL
Qty: 0 | Refills: 0 | Status: ACTIVE | COMMUNITY
Start: 2020-07-13

## 2024-02-16 RX ORDER — FLUOXETINE 20 MG/1
FLUOXETINE HCL( 20MG ORAL  DAILY ) ACTIVE -HX ENTRY CAPSULE ORAL DAILY
Status: ACTIVE | COMMUNITY
Start: 2020-09-30

## 2024-02-16 RX ORDER — NITROGLYCERIN 0.4 MG/1
NITROGLYCERIN( 0.4MG SUBLINGUAL  AS NEEDED ) ACTIVE -HX ENTRY TABLET SUBLINGUAL AS NEEDED
Status: ACTIVE | COMMUNITY
Start: 2020-09-30

## 2024-02-16 RX ORDER — TAMSULOSIN HYDROCHLORIDE 0.4 MG/1
TAMSULOSIN HCL( 0.4MG ORAL  DAILY ) ACTIVE -HX ENTRY CAPSULE ORAL DAILY
Status: ACTIVE | COMMUNITY
Start: 2020-09-30

## 2024-02-16 RX ORDER — CIDER VINEGAR 300 MG
FISH OIL( 1000MG ORAL  TWICE A DAY ) ACTIVE -HX ENTRY TABLET ORAL TWICE A DAY
Status: ACTIVE | COMMUNITY
Start: 2020-09-30

## 2024-02-16 RX ORDER — ASPIRIN 325 MG/1
ASPIRIN( 325MG ORAL  DAILY ) ACTIVE -HX ENTRY TABLET ORAL DAILY
Status: ACTIVE | COMMUNITY
Start: 2020-09-30

## 2024-02-16 NOTE — HPI-TODAY'S VISIT:
Case of a 68 yom that comes in today for screening for candidacy to the protocol [id number].  Upon interrogation he refers is feeling well without acute complaints.  Denies worsening abdominal distention.  Denies abdominal pain nausea vomiting.  Denies melena hematochezia hematemesis coffee-ground emesis.  He comes in today for evaluation.

## 2024-03-19 ENCOUNTER — LAB (OUTPATIENT)
Dept: URBAN - METROPOLITAN AREA CLINIC 68 | Facility: CLINIC | Age: 69
End: 2024-03-19

## 2024-03-19 ENCOUNTER — OV EP (OUTPATIENT)
Dept: URBAN - METROPOLITAN AREA CLINIC 68 | Facility: CLINIC | Age: 69
End: 2024-03-19
Payer: OTHER GOVERNMENT

## 2024-03-19 VITALS
SYSTOLIC BLOOD PRESSURE: 136 MMHG | WEIGHT: 250 LBS | DIASTOLIC BLOOD PRESSURE: 80 MMHG | HEIGHT: 73 IN | BODY MASS INDEX: 33.13 KG/M2

## 2024-03-19 DIAGNOSIS — K74.60 CIRRHOSIS OF LIVER WITHOUT ASCITES, UNSPECIFIED HEPATIC CIRRHOSIS TYPE: ICD-10-CM

## 2024-03-19 PROCEDURE — 99214 OFFICE O/P EST MOD 30 MIN: CPT | Performed by: INTERNAL MEDICINE

## 2024-03-19 RX ORDER — FUROSEMIDE 40 MG/1
FUROSEMIDE( 40MG ORAL  DAILY ) ACTIVE -HX ENTRY TABLET ORAL DAILY
Status: ACTIVE | COMMUNITY
Start: 2020-09-30

## 2024-03-19 RX ORDER — LISINOPRIL 10 MG/1
LISINOPRIL( 10MG ORAL  DAILY ) ACTIVE -HX ENTRY TABLET ORAL DAILY
Status: ACTIVE | COMMUNITY
Start: 2020-09-30

## 2024-03-19 RX ORDER — PANTOPRAZOLE SODIUM 40 MG/1
TABLET, DELAYED RELEASE ORAL DAILY
Qty: 90 | Refills: 90 | Status: ACTIVE | COMMUNITY
Start: 2020-07-13

## 2024-03-19 RX ORDER — FLUOXETINE 20 MG/1
FLUOXETINE HCL( 20MG ORAL  DAILY ) ACTIVE -HX ENTRY CAPSULE ORAL DAILY
Status: ACTIVE | COMMUNITY
Start: 2020-09-30

## 2024-03-19 RX ORDER — SERTRALINE 100 MG/1
SERTRALINE HCL( 100MG ORAL  DAILY ) ACTIVE -HX ENTRY TABLET, FILM COATED ORAL DAILY
Status: ACTIVE | COMMUNITY
Start: 2020-09-30

## 2024-03-19 RX ORDER — LEVOTHYROXINE SODIUM 0.2 MG/1
LEVOTHYROXINE SODIUM( 200MCG ORAL  DAILY ) ACTIVE -HX ENTRY TABLET ORAL DAILY
Status: ACTIVE | COMMUNITY
Start: 2020-09-30

## 2024-03-19 RX ORDER — CLOPIDOGREL 75 MG/1
CLOPIDOGREL BISULFATE( 75MG ORAL  DAILY ) ACTIVE -HX ENTRY TABLET ORAL DAILY
Status: ACTIVE | COMMUNITY
Start: 2020-09-30

## 2024-03-19 RX ORDER — ASPIRIN 325 MG/1
ASPIRIN( 325MG ORAL  DAILY ) ACTIVE -HX ENTRY TABLET ORAL DAILY
Status: ACTIVE | COMMUNITY
Start: 2020-09-30

## 2024-03-19 RX ORDER — TAMSULOSIN HYDROCHLORIDE 0.4 MG/1
TAMSULOSIN HCL( 0.4MG ORAL  DAILY ) ACTIVE -HX ENTRY CAPSULE ORAL DAILY
Status: ACTIVE | COMMUNITY
Start: 2020-09-30

## 2024-03-19 RX ORDER — NITROGLYCERIN 0.4 MG/1
NITROGLYCERIN( 0.4MG SUBLINGUAL  AS NEEDED ) ACTIVE -HX ENTRY TABLET SUBLINGUAL AS NEEDED
Status: ACTIVE | COMMUNITY
Start: 2020-09-30

## 2024-03-19 RX ORDER — WHEAT DEXTRIN 3 G/4 G
POWDER (GRAM) ORAL
Qty: 0 | Refills: 0 | Status: ACTIVE | COMMUNITY
Start: 2020-07-13

## 2024-03-19 RX ORDER — ATORVASTATIN CALCIUM 80 MG/1
ATORVASTATIN CALCIUM( 80MG ORAL  DAILY ) ACTIVE -HX ENTRY TABLET, FILM COATED ORAL DAILY
Status: ACTIVE | COMMUNITY
Start: 2020-09-30

## 2024-03-19 RX ORDER — CIDER VINEGAR 300 MG
FISH OIL( 1000MG ORAL  TWICE A DAY ) ACTIVE -HX ENTRY TABLET ORAL TWICE A DAY
Status: ACTIVE | COMMUNITY
Start: 2020-09-30

## 2024-03-19 NOTE — HPI-TODAY'S VISIT:
Case of a  68 YOM that comes in today for follow up of cirrhosis. 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 Upon interrogation, he denies fatigue, easy bruisability, lower extremity edema, fever, weight loss or diarrhea. Denies icteric/jaundice or pruritus. Denies increasing abdominal girth. Denies confusion or sleep disturbance. Denies any signs of upper gastrointestinal bleeding (hematemesis, coffee ground emesis, melena, hematochezia).  Denies continued alcohol ingestion.  Denies illicit drug use. He has no acute complaints

## 2024-03-22 LAB
A/G RATIO: 1.7
ABSOLUTE BASOPHILS: 38
ABSOLUTE EOSINOPHILS: 118
ABSOLUTE LYMPHOCYTES: 689
ABSOLUTE MONOCYTES: 357
ABSOLUTE NEUTROPHILS: 2999
ALBUMIN: 4.7
ALKALINE PHOSPHATASE: 130
ALT (SGPT): 25
AST (SGOT): 36
BASOPHILS: 0.9
BILIRUBIN, TOTAL: 0.7
BUN/CREATININE RATIO: (no result)
BUN: 17
CALCIUM: 10.3
CARBON DIOXIDE, TOTAL: 26
CHLORIDE: 104
CREATININE: 1.1
EGFR: 74
EOSINOPHILS: 2.8
GLOBULIN, TOTAL: 2.8
GLUCOSE: 114
HEMATOCRIT: 42.2
HEMOGLOBIN: 13.8
INR: 1.2
LYMPHOCYTES: 16.4
MCH: 30.5
MCHC: 32.7
MCV: 93.4
MONOCYTES: 8.5
MPV: 12.4
NEUTROPHILS: 71.4
PLATELET COUNT: 116
POTASSIUM: 4.6
PROTEIN, TOTAL: 7.5
PT: 12.5
RDW: 15.5
RED BLOOD CELL COUNT: 4.52
SODIUM: 141
WHITE BLOOD CELL COUNT: 4.2

## 2024-04-02 ENCOUNTER — US ABD/PEL (OUTPATIENT)
Dept: URBAN - METROPOLITAN AREA CLINIC 67 | Facility: CLINIC | Age: 69
End: 2024-04-02

## 2024-04-16 ENCOUNTER — US ABD/PEL (OUTPATIENT)
Dept: URBAN - METROPOLITAN AREA CLINIC 67 | Facility: CLINIC | Age: 69
End: 2024-04-16
Payer: OTHER GOVERNMENT

## 2024-04-16 DIAGNOSIS — K80.20 CHOLELITHIASIS WITHOUT CHOLECYSTITIS: ICD-10-CM

## 2024-04-16 DIAGNOSIS — K74.60 CIRRHOSIS OF LIVER WITHOUT ASCITES, UNSPECIFIED HEPATIC CIRRHOSIS TYPE: ICD-10-CM

## 2024-04-16 PROCEDURE — 76705 ECHO EXAM OF ABDOMEN: CPT | Performed by: INTERNAL MEDICINE

## 2024-04-16 PROCEDURE — 93976 VASCULAR STUDY: CPT | Performed by: INTERNAL MEDICINE

## 2024-07-19 ENCOUNTER — DASHBOARD ENCOUNTERS (OUTPATIENT)
Age: 69
End: 2024-07-19

## 2024-07-19 ENCOUNTER — TELEPHONE ENCOUNTER (OUTPATIENT)
Dept: URBAN - METROPOLITAN AREA CLINIC 68 | Facility: CLINIC | Age: 69
End: 2024-07-19

## 2024-07-23 ENCOUNTER — LAB OUTSIDE AN ENCOUNTER (OUTPATIENT)
Dept: URBAN - METROPOLITAN AREA CLINIC 68 | Facility: CLINIC | Age: 69
End: 2024-07-23

## 2024-07-23 ENCOUNTER — OFFICE VISIT (OUTPATIENT)
Dept: URBAN - METROPOLITAN AREA CLINIC 68 | Facility: CLINIC | Age: 69
End: 2024-07-23
Payer: OTHER GOVERNMENT

## 2024-07-23 ENCOUNTER — TELEPHONE ENCOUNTER (OUTPATIENT)
Dept: URBAN - METROPOLITAN AREA CLINIC 68 | Facility: CLINIC | Age: 69
End: 2024-07-23

## 2024-07-23 VITALS
HEIGHT: 73 IN | BODY MASS INDEX: 33 KG/M2 | SYSTOLIC BLOOD PRESSURE: 116 MMHG | DIASTOLIC BLOOD PRESSURE: 60 MMHG | WEIGHT: 249 LBS

## 2024-07-23 DIAGNOSIS — K74.60 CIRRHOSIS OF LIVER WITHOUT ASCITES, UNSPECIFIED HEPATIC CIRRHOSIS TYPE: ICD-10-CM

## 2024-07-23 PROCEDURE — 99214 OFFICE O/P EST MOD 30 MIN: CPT | Performed by: INTERNAL MEDICINE

## 2024-07-23 RX ORDER — ASPIRIN 325 MG/1
ASPIRIN( 325MG ORAL  DAILY ) ACTIVE -HX ENTRY TABLET ORAL DAILY
Status: ACTIVE | COMMUNITY
Start: 2020-09-30

## 2024-07-23 RX ORDER — LISINOPRIL 10 MG/1
LISINOPRIL( 10MG ORAL  DAILY ) ACTIVE -HX ENTRY TABLET ORAL DAILY
Status: ACTIVE | COMMUNITY
Start: 2020-09-30

## 2024-07-23 RX ORDER — CIDER VINEGAR 300 MG
FISH OIL( 1000MG ORAL  TWICE A DAY ) ACTIVE -HX ENTRY TABLET ORAL TWICE A DAY
Status: ACTIVE | COMMUNITY
Start: 2020-09-30

## 2024-07-23 RX ORDER — TAMSULOSIN HYDROCHLORIDE 0.4 MG/1
TAMSULOSIN HCL( 0.4MG ORAL  DAILY ) ACTIVE -HX ENTRY CAPSULE ORAL DAILY
Status: ACTIVE | COMMUNITY
Start: 2020-09-30

## 2024-07-23 RX ORDER — NITROGLYCERIN 0.4 MG/1
NITROGLYCERIN( 0.4MG SUBLINGUAL  AS NEEDED ) ACTIVE -HX ENTRY TABLET SUBLINGUAL AS NEEDED
Status: ACTIVE | COMMUNITY
Start: 2020-09-30

## 2024-07-23 RX ORDER — ATORVASTATIN CALCIUM 80 MG/1
ATORVASTATIN CALCIUM( 80MG ORAL  DAILY ) ACTIVE -HX ENTRY TABLET, FILM COATED ORAL DAILY
Status: ACTIVE | COMMUNITY
Start: 2020-09-30

## 2024-07-23 RX ORDER — LEVOTHYROXINE SODIUM 0.2 MG/1
LEVOTHYROXINE SODIUM( 200MCG ORAL  DAILY ) ACTIVE -HX ENTRY TABLET ORAL DAILY
Status: ACTIVE | COMMUNITY
Start: 2020-09-30

## 2024-07-23 RX ORDER — SERTRALINE 100 MG/1
SERTRALINE HCL( 100MG ORAL  DAILY ) ACTIVE -HX ENTRY TABLET, FILM COATED ORAL DAILY
Status: ACTIVE | COMMUNITY
Start: 2020-09-30

## 2024-07-23 RX ORDER — CLOPIDOGREL 75 MG/1
CLOPIDOGREL BISULFATE( 75MG ORAL  DAILY ) ACTIVE -HX ENTRY TABLET ORAL DAILY
Status: ACTIVE | COMMUNITY
Start: 2020-09-30

## 2024-07-23 RX ORDER — WHEAT DEXTRIN 3 G/4 G
POWDER (GRAM) ORAL
Qty: 0 | Refills: 0 | Status: ACTIVE | COMMUNITY
Start: 2020-07-13

## 2024-07-23 RX ORDER — PANTOPRAZOLE SODIUM 40 MG/1
TABLET, DELAYED RELEASE ORAL DAILY
Qty: 90 | Refills: 90 | Status: ACTIVE | COMMUNITY
Start: 2020-07-13

## 2024-07-23 RX ORDER — FUROSEMIDE 40 MG/1
FUROSEMIDE( 40MG ORAL  DAILY ) ACTIVE -HX ENTRY TABLET ORAL DAILY
Status: ACTIVE | COMMUNITY
Start: 2020-09-30

## 2024-07-23 RX ORDER — FLUOXETINE 20 MG/1
FLUOXETINE HCL( 20MG ORAL  DAILY ) ACTIVE -HX ENTRY CAPSULE ORAL DAILY
Status: ACTIVE | COMMUNITY
Start: 2020-09-30

## 2024-07-28 LAB
A/G RATIO: 1.4
ABSOLUTE BASOPHILS: 30
ABSOLUTE EOSINOPHILS: 111
ABSOLUTE LYMPHOCYTES: 677
ABSOLUTE MONOCYTES: 303
ABSOLUTE NEUTROPHILS: 2579
ALBUMIN: 4
ALKALINE PHOSPHATASE: 155
ALT (SGPT): 19
AST (SGOT): 29
BASOPHILS: 0.8
BILIRUBIN, TOTAL: 0.5
BUN/CREATININE RATIO: (no result)
BUN: 18
CALCIUM: 9.1
CARBON DIOXIDE, TOTAL: 28
CHLORIDE: 102
CREATININE: 0.95
EGFR: 88
EOSINOPHILS: 3
GLOBULIN, TOTAL: 2.8
GLUCOSE: 101
HEMATOCRIT: 38.5
HEMOGLOBIN: 12.5
INR: 1.2
LYMPHOCYTES: 18.3
MCH: 30
MCHC: 32.5
MCV: 92.3
MONOCYTES: 8.2
MPV: 12.6
NEUTROPHILS: 69.7
PLATELET COUNT: 123
POTASSIUM: 4.2
PROTEIN, TOTAL: 6.8
PT: 12.5
RDW: 14
RED BLOOD CELL COUNT: 4.17
SODIUM: 142
WHITE BLOOD CELL COUNT: 3.7

## 2024-07-29 ENCOUNTER — TELEPHONE ENCOUNTER (OUTPATIENT)
Dept: URBAN - METROPOLITAN AREA CLINIC 68 | Facility: CLINIC | Age: 69
End: 2024-07-29

## 2024-08-13 ENCOUNTER — TELEPHONE ENCOUNTER (OUTPATIENT)
Dept: URBAN - METROPOLITAN AREA CLINIC 23 | Facility: CLINIC | Age: 69
End: 2024-08-13

## 2024-08-19 ENCOUNTER — OFFICE VISIT (OUTPATIENT)
Dept: URBAN - METROPOLITAN AREA CLINIC 68 | Facility: CLINIC | Age: 69
End: 2024-08-19
Payer: OTHER GOVERNMENT

## 2024-08-19 ENCOUNTER — OFFICE VISIT (OUTPATIENT)
Dept: URBAN - METROPOLITAN AREA CLINIC 68 | Facility: CLINIC | Age: 69
End: 2024-08-19

## 2024-08-19 VITALS
BODY MASS INDEX: 33 KG/M2 | DIASTOLIC BLOOD PRESSURE: 70 MMHG | HEART RATE: 64 BPM | HEIGHT: 73 IN | SYSTOLIC BLOOD PRESSURE: 128 MMHG | WEIGHT: 249 LBS | RESPIRATION RATE: 16 BRPM

## 2024-08-19 DIAGNOSIS — K74.60 CIRRHOSIS OF LIVER WITHOUT ASCITES, UNSPECIFIED HEPATIC CIRRHOSIS TYPE: ICD-10-CM

## 2024-08-19 PROCEDURE — 993 APS NON BILLABLE: Performed by: INTERNAL MEDICINE

## 2024-08-19 RX ORDER — LEVOTHYROXINE SODIUM 0.2 MG/1
LEVOTHYROXINE SODIUM( 200MCG ORAL  DAILY ) ACTIVE -HX ENTRY TABLET ORAL DAILY
Status: ACTIVE | COMMUNITY
Start: 2020-09-30

## 2024-08-19 RX ORDER — NITROGLYCERIN 0.4 MG/1
NITROGLYCERIN( 0.4MG SUBLINGUAL  AS NEEDED ) ACTIVE -HX ENTRY TABLET SUBLINGUAL AS NEEDED
Status: ACTIVE | COMMUNITY
Start: 2020-09-30

## 2024-08-19 RX ORDER — SERTRALINE 100 MG/1
SERTRALINE HCL( 100MG ORAL  DAILY ) ACTIVE -HX ENTRY TABLET, FILM COATED ORAL DAILY
Status: ACTIVE | COMMUNITY
Start: 2020-09-30

## 2024-08-19 RX ORDER — FUROSEMIDE 40 MG/1
FUROSEMIDE( 40MG ORAL  DAILY ) ACTIVE -HX ENTRY TABLET ORAL DAILY
Status: ACTIVE | COMMUNITY
Start: 2020-09-30

## 2024-08-19 RX ORDER — FLUOXETINE 20 MG/1
FLUOXETINE HCL( 20MG ORAL  DAILY ) ACTIVE -HX ENTRY CAPSULE ORAL DAILY
Status: ACTIVE | COMMUNITY
Start: 2020-09-30

## 2024-08-19 RX ORDER — PANTOPRAZOLE SODIUM 40 MG/1
TABLET, DELAYED RELEASE ORAL DAILY
Qty: 90 | Refills: 90 | Status: ACTIVE | COMMUNITY
Start: 2020-07-13

## 2024-08-19 RX ORDER — TAMSULOSIN HYDROCHLORIDE 0.4 MG/1
TAMSULOSIN HCL( 0.4MG ORAL  DAILY ) ACTIVE -HX ENTRY CAPSULE ORAL DAILY
Status: ACTIVE | COMMUNITY
Start: 2020-09-30

## 2024-08-19 RX ORDER — CIDER VINEGAR 300 MG
FISH OIL( 1000MG ORAL  TWICE A DAY ) ACTIVE -HX ENTRY TABLET ORAL TWICE A DAY
Status: ACTIVE | COMMUNITY
Start: 2020-09-30

## 2024-08-19 RX ORDER — CLOPIDOGREL 75 MG/1
CLOPIDOGREL BISULFATE( 75MG ORAL  DAILY ) ACTIVE -HX ENTRY TABLET ORAL DAILY
Status: ACTIVE | COMMUNITY
Start: 2020-09-30

## 2024-08-19 RX ORDER — WHEAT DEXTRIN 3 G/4 G
POWDER (GRAM) ORAL
Qty: 0 | Refills: 0 | Status: ACTIVE | COMMUNITY
Start: 2020-07-13

## 2024-08-19 RX ORDER — ASPIRIN 325 MG/1
ASPIRIN( 325MG ORAL  DAILY ) ACTIVE -HX ENTRY TABLET ORAL DAILY
Status: ACTIVE | COMMUNITY
Start: 2020-09-30

## 2024-08-19 RX ORDER — ATORVASTATIN CALCIUM 80 MG/1
ATORVASTATIN CALCIUM( 80MG ORAL  DAILY ) ACTIVE -HX ENTRY TABLET, FILM COATED ORAL DAILY
Status: ACTIVE | COMMUNITY
Start: 2020-09-30

## 2024-08-19 RX ORDER — LISINOPRIL 10 MG/1
LISINOPRIL( 10MG ORAL  DAILY ) ACTIVE -HX ENTRY TABLET ORAL DAILY
Status: ACTIVE | COMMUNITY
Start: 2020-09-30

## 2024-08-19 NOTE — HPI-TODAY'S VISIT:
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 Upon interrogation, he denies fatigue, easy bruisability, lower extremity edema, fever, weight loss or diarrhea. Denies icteric/jaundice or pruritus. Denies increasing abdominal girth. Denies confusion or sleep disturbance. Denies any signs of upper gastrointestinal bleeding (hematemesis, coffee ground emesis, melena, hematochezia). Denies continued alcohol ingestion. Denies illicit drug use. He has no acute complaints .

## 2024-08-26 ENCOUNTER — TELEPHONE ENCOUNTER (OUTPATIENT)
Dept: URBAN - METROPOLITAN AREA CLINIC 68 | Facility: CLINIC | Age: 69
End: 2024-08-26

## 2025-02-18 ENCOUNTER — OFFICE VISIT (OUTPATIENT)
Dept: URBAN - METROPOLITAN AREA CLINIC 68 | Facility: CLINIC | Age: 70
End: 2025-02-18
Payer: OTHER GOVERNMENT

## 2025-02-18 ENCOUNTER — LAB OUTSIDE AN ENCOUNTER (OUTPATIENT)
Dept: URBAN - METROPOLITAN AREA CLINIC 68 | Facility: CLINIC | Age: 70
End: 2025-02-18

## 2025-02-18 VITALS
SYSTOLIC BLOOD PRESSURE: 146 MMHG | BODY MASS INDEX: 34.19 KG/M2 | WEIGHT: 258 LBS | HEIGHT: 73 IN | DIASTOLIC BLOOD PRESSURE: 82 MMHG

## 2025-02-18 DIAGNOSIS — E55.9 VITAMIN D DEFICIENCY: ICD-10-CM

## 2025-02-18 DIAGNOSIS — K74.60 CIRRHOSIS OF LIVER WITHOUT ASCITES, UNSPECIFIED HEPATIC CIRRHOSIS TYPE: ICD-10-CM

## 2025-02-18 PROBLEM — 34713006: Status: ACTIVE | Noted: 2025-02-18

## 2025-02-18 PROCEDURE — 99214 OFFICE O/P EST MOD 30 MIN: CPT | Performed by: INTERNAL MEDICINE

## 2025-02-18 RX ORDER — SERTRALINE 100 MG/1
SERTRALINE HCL( 100MG ORAL  DAILY ) ACTIVE -HX ENTRY TABLET, FILM COATED ORAL DAILY
Status: ACTIVE | COMMUNITY
Start: 2020-09-30

## 2025-02-18 RX ORDER — CLOPIDOGREL 75 MG/1
CLOPIDOGREL BISULFATE( 75MG ORAL  DAILY ) ACTIVE -HX ENTRY TABLET ORAL DAILY
COMMUNITY
Start: 2020-09-30

## 2025-02-18 RX ORDER — TAMSULOSIN HYDROCHLORIDE 0.4 MG/1
TAMSULOSIN HCL( 0.4MG ORAL  DAILY ) ACTIVE -HX ENTRY CAPSULE ORAL DAILY
Status: ACTIVE | COMMUNITY
Start: 2020-09-30

## 2025-02-18 RX ORDER — PANTOPRAZOLE SODIUM 40 MG/1
TABLET, DELAYED RELEASE ORAL DAILY
Qty: 90 | Refills: 90 | Status: ACTIVE | COMMUNITY
Start: 2020-07-13

## 2025-02-18 RX ORDER — FLUOXETINE 20 MG/1
FLUOXETINE HCL( 20MG ORAL  DAILY ) ACTIVE -HX ENTRY CAPSULE ORAL DAILY
COMMUNITY
Start: 2020-09-30

## 2025-02-18 RX ORDER — NITROGLYCERIN 0.4 MG/1
NITROGLYCERIN( 0.4MG SUBLINGUAL  AS NEEDED ) ACTIVE -HX ENTRY TABLET SUBLINGUAL AS NEEDED
Status: ACTIVE | COMMUNITY
Start: 2020-09-30

## 2025-02-18 RX ORDER — ASPIRIN 325 MG/1
ASPIRIN( 325MG ORAL  DAILY ) ACTIVE -HX ENTRY TABLET ORAL DAILY
Status: ACTIVE | COMMUNITY
Start: 2020-09-30

## 2025-02-18 RX ORDER — FUROSEMIDE 40 MG/1
FUROSEMIDE( 40MG ORAL  DAILY ) ACTIVE -HX ENTRY TABLET ORAL DAILY
Status: ACTIVE | COMMUNITY
Start: 2020-09-30

## 2025-02-18 RX ORDER — LISINOPRIL 10 MG/1
LISINOPRIL( 10MG ORAL  DAILY ) ACTIVE -HX ENTRY TABLET ORAL DAILY
Status: ACTIVE | COMMUNITY
Start: 2020-09-30

## 2025-02-18 RX ORDER — ATORVASTATIN CALCIUM 80 MG/1
ATORVASTATIN CALCIUM( 80MG ORAL  DAILY ) ACTIVE -HX ENTRY TABLET, FILM COATED ORAL DAILY
Status: ACTIVE | COMMUNITY
Start: 2020-09-30

## 2025-02-18 RX ORDER — LEVOTHYROXINE SODIUM 0.2 MG/1
LEVOTHYROXINE SODIUM( 200MCG ORAL  DAILY ) ACTIVE -HX ENTRY TABLET ORAL DAILY
Status: ACTIVE | COMMUNITY
Start: 2020-09-30

## 2025-02-18 RX ORDER — WHEAT DEXTRIN 3 G/4 G
POWDER (GRAM) ORAL
Qty: 0 | Refills: 0 | Status: DISCONTINUED | COMMUNITY
Start: 2020-07-13

## 2025-02-18 RX ORDER — CIDER VINEGAR 300 MG
FISH OIL( 1000MG ORAL  TWICE A DAY ) ACTIVE -HX ENTRY TABLET ORAL TWICE A DAY
Status: ACTIVE | COMMUNITY
Start: 2020-09-30

## 2025-03-03 ENCOUNTER — TELEPHONE ENCOUNTER (OUTPATIENT)
Dept: URBAN - METROPOLITAN AREA CLINIC 68 | Facility: CLINIC | Age: 70
End: 2025-03-03

## 2025-03-06 ENCOUNTER — LAB OUTSIDE AN ENCOUNTER (OUTPATIENT)
Dept: URBAN - METROPOLITAN AREA CLINIC 68 | Facility: CLINIC | Age: 70
End: 2025-03-06

## 2025-03-07 ENCOUNTER — TELEPHONE ENCOUNTER (OUTPATIENT)
Dept: URBAN - METROPOLITAN AREA CLINIC 68 | Facility: CLINIC | Age: 70
End: 2025-03-07

## 2025-03-10 ENCOUNTER — TELEPHONE ENCOUNTER (OUTPATIENT)
Dept: URBAN - METROPOLITAN AREA CLINIC 68 | Facility: CLINIC | Age: 70
End: 2025-03-10

## 2025-03-10 ENCOUNTER — LAB OUTSIDE AN ENCOUNTER (OUTPATIENT)
Dept: URBAN - METROPOLITAN AREA CLINIC 68 | Facility: CLINIC | Age: 70
End: 2025-03-10

## 2025-03-10 LAB
A/G RATIO: 1.4
ABSOLUTE BASOPHILS: 41
ABSOLUTE EOSINOPHILS: 160
ABSOLUTE LYMPHOCYTES: 551
ABSOLUTE MONOCYTES: 201
ABSOLUTE NEUTROPHILS: 2448
AFP, SERUM, TUMOR MARKER: 3
ALBUMIN: 4.2
ALKALINE PHOSPHATASE: 219
ALT (SGPT): 35
AST (SGOT): 52
BASOPHILS: 1.2
BILIRUBIN, TOTAL: 1.2
BUN/CREATININE RATIO: (no result)
BUN: 18
CALCIUM: 9.7
CARBON DIOXIDE, TOTAL: 29
CHLORIDE: 103
CREATININE: 1.09
EGFR: 74
EOSINOPHILS: 4.7
GLOBULIN, TOTAL: 3
GLUCOSE: 110
HEMATOCRIT: 43.1
HEMOGLOBIN: 14.1
INR: 1.2
LYMPHOCYTES: 16.2
MCH: 32.9
MCHC: 32.7
MCV: 100.5
MONOCYTES: 5.9
MPV: 13.1
NEUTROPHILS: 72
PLATELET COUNT: 108
POTASSIUM: 4.9
PROTEIN, TOTAL: 7.2
PT: 12.1
RDW: 14.2
RED BLOOD CELL COUNT: 4.29
SODIUM: 142
VITAMIN D, 1,25 (OH)2,: 52
VITAMIN D,25-OH,TOTAL,IA: 24
VITAMIN D2, 1,25 (OH)2: <8
VITAMIN D3, 1,25 (OH)2: 52
WHITE BLOOD CELL COUNT: 3.4

## 2025-05-22 ENCOUNTER — LAB OUTSIDE AN ENCOUNTER (OUTPATIENT)
Dept: URBAN - METROPOLITAN AREA CLINIC 68 | Facility: CLINIC | Age: 70
End: 2025-05-22

## 2025-05-22 ENCOUNTER — OFFICE VISIT (OUTPATIENT)
Dept: URBAN - METROPOLITAN AREA CLINIC 68 | Facility: CLINIC | Age: 70
End: 2025-05-22
Payer: OTHER GOVERNMENT

## 2025-05-22 DIAGNOSIS — K74.60 CIRRHOSIS OF LIVER WITHOUT ASCITES, UNSPECIFIED HEPATIC CIRRHOSIS TYPE: ICD-10-CM

## 2025-05-22 DIAGNOSIS — E55.9 VITAMIN D DEFICIENCY: ICD-10-CM

## 2025-05-22 PROCEDURE — 99214 OFFICE O/P EST MOD 30 MIN: CPT | Performed by: INTERNAL MEDICINE

## 2025-05-22 RX ORDER — CIDER VINEGAR 300 MG
FISH OIL( 1000MG ORAL  TWICE A DAY ) ACTIVE -HX ENTRY TABLET ORAL TWICE A DAY
Status: ACTIVE | COMMUNITY
Start: 2020-09-30

## 2025-05-22 RX ORDER — LEVOTHYROXINE SODIUM 0.2 MG/1
LEVOTHYROXINE SODIUM( 200MCG ORAL  DAILY ) ACTIVE -HX ENTRY TABLET ORAL DAILY
Status: ACTIVE | COMMUNITY
Start: 2020-09-30

## 2025-05-22 RX ORDER — NITROGLYCERIN 0.4 MG/1
NITROGLYCERIN( 0.4MG SUBLINGUAL  AS NEEDED ) ACTIVE -HX ENTRY TABLET SUBLINGUAL AS NEEDED
Status: ACTIVE | COMMUNITY
Start: 2020-09-30

## 2025-05-22 RX ORDER — FUROSEMIDE 40 MG/1
FUROSEMIDE( 40MG ORAL  DAILY ) ACTIVE -HX ENTRY TABLET ORAL DAILY
Status: ACTIVE | COMMUNITY
Start: 2020-09-30

## 2025-05-22 RX ORDER — FLUOXETINE 20 MG/1
FLUOXETINE HCL( 20MG ORAL  DAILY ) ACTIVE -HX ENTRY CAPSULE ORAL DAILY
Status: ACTIVE | COMMUNITY
Start: 2020-09-30

## 2025-05-22 RX ORDER — TAMSULOSIN HYDROCHLORIDE 0.4 MG/1
TAMSULOSIN HCL( 0.4MG ORAL  DAILY ) ACTIVE -HX ENTRY CAPSULE ORAL DAILY
Status: ACTIVE | COMMUNITY
Start: 2020-09-30

## 2025-05-22 RX ORDER — ATORVASTATIN CALCIUM 80 MG/1
ATORVASTATIN CALCIUM( 80MG ORAL  DAILY ) ACTIVE -HX ENTRY TABLET, FILM COATED ORAL DAILY
Status: ACTIVE | COMMUNITY
Start: 2020-09-30

## 2025-05-22 RX ORDER — CLOPIDOGREL 75 MG/1
CLOPIDOGREL BISULFATE( 75MG ORAL  DAILY ) ACTIVE -HX ENTRY TABLET ORAL DAILY
Status: ACTIVE | COMMUNITY
Start: 2020-09-30

## 2025-05-22 RX ORDER — PANTOPRAZOLE SODIUM 40 MG/1
TABLET, DELAYED RELEASE ORAL DAILY
Qty: 90 | Refills: 90 | Status: ACTIVE | COMMUNITY
Start: 2020-07-13

## 2025-05-22 RX ORDER — SERTRALINE 100 MG/1
SERTRALINE HCL( 100MG ORAL  DAILY ) ACTIVE -HX ENTRY TABLET, FILM COATED ORAL DAILY
Status: ACTIVE | COMMUNITY
Start: 2020-09-30

## 2025-05-22 RX ORDER — ASPIRIN 325 MG/1
ASPIRIN( 325MG ORAL  DAILY ) ACTIVE -HX ENTRY TABLET ORAL DAILY
Status: ACTIVE | COMMUNITY
Start: 2020-09-30

## 2025-05-22 RX ORDER — LISINOPRIL 10 MG/1
LISINOPRIL( 10MG ORAL  DAILY ) ACTIVE -HX ENTRY TABLET ORAL DAILY
Status: ACTIVE | COMMUNITY
Start: 2020-09-30

## 2025-06-20 NOTE — PHYSICAL EXAM CONSTITUTIONAL:
well developed, well nourished , in no acute distress , ambulating without difficulty , normal communication ability 51